# Patient Record
Sex: FEMALE | Race: WHITE | Employment: OTHER | URBAN - METROPOLITAN AREA
[De-identification: names, ages, dates, MRNs, and addresses within clinical notes are randomized per-mention and may not be internally consistent; named-entity substitution may affect disease eponyms.]

---

## 2020-08-23 ENCOUNTER — APPOINTMENT (OUTPATIENT)
Dept: GENERAL RADIOLOGY | Age: 75
DRG: 871 | End: 2020-08-23
Payer: MEDICARE

## 2020-08-23 ENCOUNTER — APPOINTMENT (OUTPATIENT)
Dept: CT IMAGING | Age: 75
DRG: 871 | End: 2020-08-23
Payer: MEDICARE

## 2020-08-23 ENCOUNTER — HOSPITAL ENCOUNTER (INPATIENT)
Age: 75
LOS: 1 days | Discharge: ANOTHER HOSPITAL | DRG: 871 | End: 2020-08-24
Attending: INTERNAL MEDICINE | Admitting: INTERNAL MEDICINE
Payer: MEDICARE

## 2020-08-23 PROBLEM — N17.9 AKI (ACUTE KIDNEY INJURY) (HCC): Status: ACTIVE | Noted: 2020-08-23

## 2020-08-23 PROBLEM — S92.901A CLOSED FRACTURE OF BONE OF RIGHT FOOT: Status: ACTIVE | Noted: 2020-08-23
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
## 2020-08-23 PROBLEM — N13.30 HYDRONEPHROSIS: Status: ACTIVE | Noted: 2020-08-23

## 2020-08-23 PROBLEM — I48.91 A-FIB (HCC): Status: ACTIVE | Noted: 2020-08-23

## 2020-08-23 PROBLEM — N39.0 UTI (URINARY TRACT INFECTION): Status: ACTIVE | Noted: 2020-08-23

## 2020-08-23 PROBLEM — R79.1 SUPRATHERAPEUTIC INR: Status: ACTIVE | Noted: 2020-08-23

## 2020-08-23 LAB
ABO/RH: NORMAL
ALBUMIN SERPL-MCNC: 4 G/DL (ref 3.5–4.6)
ALP BLD-CCNC: 62 U/L (ref 40–130)
ALT SERPL-CCNC: 7 U/L (ref 0–33)
ANION GAP SERPL CALCULATED.3IONS-SCNC: 16 MEQ/L (ref 9–15)
ANTIBODY SCREEN: NORMAL
APTT: 53.5 SEC (ref 24.4–36.8)
AST SERPL-CCNC: 16 U/L (ref 0–35)
BACTERIA: ABNORMAL /HPF
BASOPHILS ABSOLUTE: 0 K/UL (ref 0–0.2)
BASOPHILS RELATIVE PERCENT: 0.1 %
BILIRUB SERPL-MCNC: 1.6 MG/DL (ref 0.2–0.7)
BILIRUBIN URINE: NEGATIVE
BLOOD, URINE: ABNORMAL
BUN BLDV-MCNC: 53 MG/DL (ref 8–23)
CALCIUM SERPL-MCNC: 9.7 MG/DL (ref 8.5–9.9)
CHLORIDE BLD-SCNC: 89 MEQ/L (ref 95–107)
CLARITY: ABNORMAL
CO2: 25 MEQ/L (ref 20–31)
COLOR: YELLOW
CREAT SERPL-MCNC: 2.32 MG/DL (ref 0.5–0.9)
CREATININE URINE: 72.7 MG/DL
EKG ATRIAL RATE: 43 BPM
EKG Q-T INTERVAL: 400 MS
EKG QRS DURATION: 88 MS
EKG QTC CALCULATION (BAZETT): 470 MS
EKG R AXIS: 27 DEGREES
EKG T AXIS: 15 DEGREES
EKG VENTRICULAR RATE: 83 BPM
EOSINOPHILS ABSOLUTE: 0 K/UL (ref 0–0.7)
EOSINOPHILS RELATIVE PERCENT: 0 %
EPITHELIAL CELLS, UA: ABNORMAL /HPF (ref 0–5)
GFR AFRICAN AMERICAN: 24.6
GFR NON-AFRICAN AMERICAN: 20.3
GLOBULIN: 3.1 G/DL (ref 2.3–3.5)
GLUCOSE BLD-MCNC: 95 MG/DL (ref 70–99)
GLUCOSE URINE: NEGATIVE MG/DL
HCT VFR BLD CALC: 36.2 % (ref 37–47)
HEMOGLOBIN: 11.7 G/DL (ref 12–16)
HYALINE CASTS: ABNORMAL /HPF (ref 0–5)
INR BLD: 4.2
KETONES, URINE: NEGATIVE MG/DL
LEUKOCYTE ESTERASE, URINE: ABNORMAL
LYMPHOCYTES ABSOLUTE: 0.4 K/UL (ref 1–4.8)
LYMPHOCYTES RELATIVE PERCENT: 2.1 %
MCH RBC QN AUTO: 27.9 PG (ref 27–31.3)
MCHC RBC AUTO-ENTMCNC: 32.3 % (ref 33–37)
MCV RBC AUTO: 86.1 FL (ref 82–100)
MONOCYTES ABSOLUTE: 0.4 K/UL (ref 0.2–0.8)
MONOCYTES RELATIVE PERCENT: 2.6 %
NEUTROPHILS ABSOLUTE: 16.5 K/UL (ref 1.4–6.5)
NEUTROPHILS RELATIVE PERCENT: 95.2 %
NITRITE, URINE: NEGATIVE
PDW BLD-RTO: 15.7 % (ref 11.5–14.5)
PH UA: 5 (ref 5–9)
PLATELET # BLD: 266 K/UL (ref 130–400)
POTASSIUM SERPL-SCNC: 3.6 MEQ/L (ref 3.4–4.9)
PRO-BNP: 5827 PG/ML
PROTEIN UA: 100 MG/DL
PROTHROMBIN TIME: 40 SEC (ref 12.3–14.9)
RBC # BLD: 4.2 M/UL (ref 4.2–5.4)
RBC UA: ABNORMAL /HPF (ref 0–5)
SODIUM BLD-SCNC: 130 MEQ/L (ref 135–144)
SODIUM URINE: 37 MEQ/L
SPECIFIC GRAVITY UA: 1.01 (ref 1–1.03)
TOTAL CK: 129 U/L (ref 0–170)
TOTAL PROTEIN: 7.1 G/DL (ref 6.3–8)
TROPONIN: <0.01 NG/ML (ref 0–0.01)
URINE REFLEX TO CULTURE: YES
UROBILINOGEN, URINE: 1 E.U./DL
WBC # BLD: 17.4 K/UL (ref 4.8–10.8)
WBC UA: >100 /HPF (ref 0–5)

## 2020-08-23 PROCEDURE — 72170 X-RAY EXAM OF PELVIS: CPT

## 2020-08-23 PROCEDURE — 82550 ASSAY OF CK (CPK): CPT

## 2020-08-23 PROCEDURE — 1210000000 HC MED SURG R&B

## 2020-08-23 PROCEDURE — 99285 EMERGENCY DEPT VISIT HI MDM: CPT

## 2020-08-23 PROCEDURE — 6830039000 HC L3 TRAUMA ALERT

## 2020-08-23 PROCEDURE — 2580000003 HC RX 258: Performed by: PERSONAL EMERGENCY RESPONSE ATTENDANT

## 2020-08-23 PROCEDURE — 93005 ELECTROCARDIOGRAM TRACING: CPT | Performed by: PHYSICIAN ASSISTANT

## 2020-08-23 PROCEDURE — 87040 BLOOD CULTURE FOR BACTERIA: CPT

## 2020-08-23 PROCEDURE — 82570 ASSAY OF URINE CREATININE: CPT

## 2020-08-23 PROCEDURE — 6360000002 HC RX W HCPCS: Performed by: PERSONAL EMERGENCY RESPONSE ATTENDANT

## 2020-08-23 PROCEDURE — 87149 DNA/RNA DIRECT PROBE: CPT

## 2020-08-23 PROCEDURE — 72131 CT LUMBAR SPINE W/O DYE: CPT

## 2020-08-23 PROCEDURE — 73590 X-RAY EXAM OF LOWER LEG: CPT

## 2020-08-23 PROCEDURE — 73630 X-RAY EXAM OF FOOT: CPT

## 2020-08-23 PROCEDURE — 36415 COLL VENOUS BLD VENIPUNCTURE: CPT

## 2020-08-23 PROCEDURE — 86900 BLOOD TYPING SEROLOGIC ABO: CPT

## 2020-08-23 PROCEDURE — 96375 TX/PRO/DX INJ NEW DRUG ADDON: CPT

## 2020-08-23 PROCEDURE — 71045 X-RAY EXAM CHEST 1 VIEW: CPT

## 2020-08-23 PROCEDURE — 85610 PROTHROMBIN TIME: CPT

## 2020-08-23 PROCEDURE — 87077 CULTURE AEROBIC IDENTIFY: CPT

## 2020-08-23 PROCEDURE — 87086 URINE CULTURE/COLONY COUNT: CPT

## 2020-08-23 PROCEDURE — 70450 CT HEAD/BRAIN W/O DYE: CPT

## 2020-08-23 PROCEDURE — 84300 ASSAY OF URINE SODIUM: CPT

## 2020-08-23 PROCEDURE — 85025 COMPLETE CBC W/AUTO DIFF WBC: CPT

## 2020-08-23 PROCEDURE — 96365 THER/PROPH/DIAG IV INF INIT: CPT

## 2020-08-23 PROCEDURE — 73564 X-RAY EXAM KNEE 4 OR MORE: CPT

## 2020-08-23 PROCEDURE — 83880 ASSAY OF NATRIURETIC PEPTIDE: CPT

## 2020-08-23 PROCEDURE — 84484 ASSAY OF TROPONIN QUANT: CPT

## 2020-08-23 PROCEDURE — 72125 CT NECK SPINE W/O DYE: CPT

## 2020-08-23 PROCEDURE — 86901 BLOOD TYPING SEROLOGIC RH(D): CPT

## 2020-08-23 PROCEDURE — 86850 RBC ANTIBODY SCREEN: CPT

## 2020-08-23 PROCEDURE — 87186 SC STD MICRODIL/AGAR DIL: CPT

## 2020-08-23 PROCEDURE — 80053 COMPREHEN METABOLIC PANEL: CPT

## 2020-08-23 PROCEDURE — 85730 THROMBOPLASTIN TIME PARTIAL: CPT

## 2020-08-23 PROCEDURE — 81001 URINALYSIS AUTO W/SCOPE: CPT

## 2020-08-23 RX ORDER — ONDANSETRON 2 MG/ML
4 INJECTION INTRAMUSCULAR; INTRAVENOUS ONCE
Status: COMPLETED | OUTPATIENT
Start: 2020-08-23 | End: 2020-08-23

## 2020-08-23 RX ORDER — SODIUM CHLORIDE 0.9 % (FLUSH) 0.9 %
10 SYRINGE (ML) INJECTION EVERY 12 HOURS SCHEDULED
Status: DISCONTINUED | OUTPATIENT
Start: 2020-08-24 | End: 2020-08-24 | Stop reason: HOSPADM

## 2020-08-23 RX ORDER — ALLOPURINOL 100 MG/1
25 TABLET ORAL DAILY
COMMUNITY

## 2020-08-23 RX ORDER — CARVEDILOL 25 MG/1
25 TABLET ORAL 2 TIMES DAILY WITH MEALS
COMMUNITY

## 2020-08-23 RX ORDER — ONDANSETRON 2 MG/ML
4 INJECTION INTRAMUSCULAR; INTRAVENOUS EVERY 6 HOURS PRN
Status: DISCONTINUED | OUTPATIENT
Start: 2020-08-23 | End: 2020-08-24 | Stop reason: HOSPADM

## 2020-08-23 RX ORDER — ACETAMINOPHEN 325 MG/1
650 TABLET ORAL EVERY 6 HOURS PRN
Status: DISCONTINUED | OUTPATIENT
Start: 2020-08-23 | End: 2020-08-24 | Stop reason: HOSPADM

## 2020-08-23 RX ORDER — SODIUM CHLORIDE 0.9 % (FLUSH) 0.9 %
10 SYRINGE (ML) INJECTION PRN
Status: DISCONTINUED | OUTPATIENT
Start: 2020-08-23 | End: 2020-08-24 | Stop reason: HOSPADM

## 2020-08-23 RX ORDER — ASPIRIN 81 MG/1
81 TABLET, CHEWABLE ORAL DAILY
COMMUNITY

## 2020-08-23 RX ORDER — PROMETHAZINE HYDROCHLORIDE 12.5 MG/1
12.5 TABLET ORAL EVERY 6 HOURS PRN
Status: DISCONTINUED | OUTPATIENT
Start: 2020-08-23 | End: 2020-08-24 | Stop reason: HOSPADM

## 2020-08-23 RX ORDER — SODIUM CHLORIDE 9 MG/ML
INJECTION, SOLUTION INTRAVENOUS CONTINUOUS
Status: DISCONTINUED | OUTPATIENT
Start: 2020-08-24 | End: 2020-08-24 | Stop reason: HOSPADM

## 2020-08-23 RX ORDER — MYCOPHENOLATE MOFETIL 500 MG/1
TABLET ORAL 2 TIMES DAILY
COMMUNITY

## 2020-08-23 RX ORDER — MORPHINE SULFATE 2 MG/ML
2 INJECTION, SOLUTION INTRAMUSCULAR; INTRAVENOUS ONCE
Status: COMPLETED | OUTPATIENT
Start: 2020-08-23 | End: 2020-08-23

## 2020-08-23 RX ORDER — PREDNISONE 1 MG/1
5 TABLET ORAL DAILY
COMMUNITY

## 2020-08-23 RX ORDER — ACETAMINOPHEN 650 MG/1
650 SUPPOSITORY RECTAL EVERY 6 HOURS PRN
Status: DISCONTINUED | OUTPATIENT
Start: 2020-08-23 | End: 2020-08-24 | Stop reason: HOSPADM

## 2020-08-23 RX ADMIN — ONDANSETRON 4 MG: 2 INJECTION INTRAMUSCULAR; INTRAVENOUS at 19:45

## 2020-08-23 RX ADMIN — MORPHINE SULFATE 2 MG: 2 INJECTION, SOLUTION INTRAMUSCULAR; INTRAVENOUS at 19:45

## 2020-08-23 RX ADMIN — CEFTRIAXONE SODIUM 1 G: 1 INJECTION, POWDER, FOR SOLUTION INTRAMUSCULAR; INTRAVENOUS at 19:45

## 2020-08-23 ASSESSMENT — ENCOUNTER SYMPTOMS
NAUSEA: 0
VOMITING: 0
SINUS PRESSURE: 0
EYE DISCHARGE: 0
WHEEZING: 0
RHINORRHEA: 0
COLOR CHANGE: 1
COLOR CHANGE: 0
CONSTIPATION: 0
ABDOMINAL PAIN: 0
TROUBLE SWALLOWING: 0
DIARRHEA: 0
SHORTNESS OF BREATH: 0
SORE THROAT: 0
COUGH: 0
ABDOMINAL DISTENTION: 0

## 2020-08-23 ASSESSMENT — PAIN DESCRIPTION - DESCRIPTORS
DESCRIPTORS: SHARP
DESCRIPTORS: ACHING
DESCRIPTORS: SHARP

## 2020-08-23 ASSESSMENT — PAIN DESCRIPTION - ORIENTATION
ORIENTATION: RIGHT;LEFT
ORIENTATION: RIGHT

## 2020-08-23 ASSESSMENT — PAIN DESCRIPTION - PAIN TYPE
TYPE: ACUTE PAIN

## 2020-08-23 ASSESSMENT — PAIN DESCRIPTION - FREQUENCY
FREQUENCY: OTHER (COMMENT)
FREQUENCY: INTERMITTENT
FREQUENCY: CONTINUOUS

## 2020-08-23 ASSESSMENT — PAIN SCALES - GENERAL
PAINLEVEL_OUTOF10: 9
PAINLEVEL_OUTOF10: 8
PAINLEVEL_OUTOF10: 8
PAINLEVEL_OUTOF10: 5

## 2020-08-23 ASSESSMENT — PAIN DESCRIPTION - LOCATION
LOCATION: LEG
LOCATION: KNEE
LOCATION: KNEE

## 2020-08-23 NOTE — ED NOTES
Patient arrived via [address] [place] [address],[address] with complaints of a fall last night around 1 am. Patient states she fell down on her knees and then on to her buttocks. Patient has pain in her right knee and bruising and a skin tear  on her right buttocks. Patient denies hitting her head or any other complaints.      Magali Louis RN  08/23/20 7010

## 2020-08-23 NOTE — ED PROVIDER NOTES
[address] [facility] ED  eMERGENCY dEPARTMENT eNCOUnter      Pt Name: Kely Mckeon  MRN: [id number]  [place] 1945  Date of evaluation: 8/23/2020  Provider: Lary Gaona Eldred Nat       Chief Complaint   Patient presents with    Fall         HISTORY OF PRESENT ILLNESS   (Location/Symptom, Timing/Onset,Context/Setting, Quality, Duration, Modifying Factors, Severity)  Note limiting factors. Kely Mckeon is a 66 y.o. female who presents to the emergency department with a complaint of buttock pain, right knee pain, right tib-fib pain, right foot pain, secondary to a fall which patient states occurred last evening about 4:00 in the morning. Patient states she was getting up to go to the bathroom, she walked a few steps, she was very dizzy, felt as if the room was spinning, she tried to get her cane, and states she fell forward after her legs got weak landing on her knees, she states she did fall back and struck her buttock up against a dresser. She denies any loss of consciousness, no head neck or back pain at this time. He is currently rating her buttock, and right knee pain 8 out of 10. Patient states around 1 AM she got up and patient up on the edge of the bed but states she had this weird feeling in her head. She denies dizziness or lightheadedness. She states she had a bunch of thoughts running through her head that were disconnected. She then stood up and started to walk and got woozy, again denied dizziness or lightheadedness, and states she fell forward with her legs underneath her. She was able to use a stepstool as a walker to get back to her bed and has been minimally ambulatory throughout the day. She does live by herself. She does admit to chronic shortness of breath, denies any increase in leg swelling, no chest pain or cough. HPI    NursingNotes were reviewed.     REVIEW OF SYSTEMS    (2-9 systems for level 4, 10 or more for level 5)     Review of Systems Constitutional: Negative for activity change and appetite change. HENT: Negative for congestion, ear discharge, ear pain, nosebleeds, rhinorrhea and sore throat. Eyes: Negative for discharge. Respiratory: Negative for shortness of breath. Cardiovascular: Negative for chest pain, palpitations and leg swelling. Gastrointestinal: Negative for abdominal distention, abdominal pain and constipation. Genitourinary: Negative for difficulty urinating and dysuria. Musculoskeletal: Negative for arthralgias. Right knee pain, right buttock pain, right gluteal pain. Skin: Negative for color change. Neurological: Positive for dizziness and weakness. Negative for tremors, syncope, numbness and headaches. Psychiatric/Behavioral: Negative for agitation and confusion. Except as noted above the remainder of the review of systems was reviewed and negative. PAST MEDICAL HISTORY     Past Medical History:   Diagnosis Date    Atrial fibrillation ([facility] [address]     Chronic kidney disease     COPD (chronic obstructive pulmonary disease) ([facility] [address] [facility]     Hypertension     Retroperitoneal fibrosis          SURGICALHISTORY     History reviewed. No pertinent surgical history. CURRENT MEDICATIONS       Previous Medications    ALLOPURINOL (ZYLOPRIM) 100 MG TABLET    Take 25 mg by mouth daily    ASPIRIN 81 MG CHEWABLE TABLET    Take 81 mg by mouth daily    CARVEDILOL (COREG) 25 MG TABLET    Take 25 mg by mouth 2 times daily (with meals)    MYCOPHENOLATE (CELLCEPT) 500 MG TABLET    Take by mouth 2 times daily    PREDNISONE (DELTASONE) 5 MG TABLET    Take 5 mg by mouth daily       ALLERGIES     Metoprolol and Sulfa antibiotics    FAMILY HISTORY     History reviewed. No pertinent family history.        SOCIAL HISTORY       Social History     Socioeconomic History    Marital status: Single     Spouse name: None    Number of children: None    Years of education: None    Highest education level: None   Occupational History    None   Social Needs    Financial resource strain: None    Food insecurity     Worry: None     Inability: None    Transportation needs     Medical: None     Non-medical: None   Tobacco Use    Smoking status: Never Smoker    Smokeless tobacco: Never Used   Substance and Sexual Activity    Alcohol use: Yes     Alcohol/week: 2.0 standard drinks     Types: 2 Glasses of wine per week    Drug use: Never    Sexual activity: Not Currently   Lifestyle    Physical activity     Days per week: None     Minutes per session: None    Stress: None   Relationships    Social connections     Talks on phone: None     Gets together: None     Attends [employer] service: None     Active member of club or organization: None     Attends meetings of clubs or organizations: None     Relationship status: None    Intimate partner violence     Fear of current or ex partner: None     Emotionally abused: None     Physically abused: None     Forced sexual activity: None   Other Topics Concern    None   Social History Narrative    None       SCREENINGS    Vanderbilt Coma Scale  Eye Opening: Spontaneous  Best Verbal Response: Oriented  Best Motor Response: Obeys commands  Christophe Coma Scale Score: 15 @FLOW([id number])@      PHYSICAL EXAM    (up to 7 for level 4, 8 or more for level 5)     ED Triage Vitals   BP Temp Temp Source Pulse Resp SpO2 Height Weight   08/23/20 1704 08/23/20 1708 08/23/20 1708 08/23/20 1704 08/23/20 1704 08/23/20 1704 08/23/20 1708 08/23/20 1708   (!) 143/59 98.6 °F (37 °C) Oral 69 22 96 % 5' (1.524 m) 215 lb (97.5 kg)       Physical Exam  Vitals signs and nursing note reviewed. Constitutional:       General: She is not in acute distress. Appearance: She is well-developed. She is not ill-appearing, toxic-appearing or diaphoretic. HENT:      Head: Normocephalic and atraumatic.       Comments: Head and scalp are atraumatic, there is no cut scrapes abrasions noted, no depressions, no deformity, no bleeding is noted. No pain on palpation. Right Ear: Tympanic membrane normal.      Left Ear: Tympanic membrane normal.      Ears:      Comments: Tympanic membrane's are intact, there is no drainage or discharge noted from either ear. Nose: No congestion. Comments: There is intact, no deformity, no pain on palpation, no drainage or discharge. Mouth/Throat:      Mouth: Mucous membranes are moist.      Pharynx: No oropharyngeal exudate or posterior oropharyngeal erythema. Eyes:      Extraocular Movements: Extraocular movements intact. Conjunctiva/sclera: Conjunctivae normal.      Pupils: Pupils are equal, round, and reactive to light. Comments: Nipples are equal round reactive to light at approximately 3 mm, no nystagmus, no stare or gaze. Neck:      Musculoskeletal: Normal range of motion and neck supple. No neck rigidity. Vascular: No JVD. Trachea: No tracheal deviation. Comments: Neck is supple, there is no midline tenderness, no step-off, no deformity, no crepitus, full range of motion without any increased pain. Cardiovascular:      Rate and Rhythm: Normal rate. Pulses: Normal pulses. Heart sounds: Normal heart sounds. No murmur. No friction rub. No gallop. Pulmonary:      Effort: Pulmonary effort is normal. No tachypnea, accessory muscle usage, respiratory distress or retractions. Breath sounds: No stridor. No wheezing, rhonchi or rales. Comments: Lungs are clear in all fields, no wheezes rales or rhonchi heard, accessory muscle use, retractions, no pain on palpation across upper chest, mid chest, or posterior chest wall. No paradoxical movement, no flail segments. Room air saturations are 97%  Chest:      Chest wall: No tenderness. Abdominal:      General: Abdomen is flat. Bowel sounds are normal. There is no distension or abdominal bruit. Palpations:  There is no shifting dullness, fluid wave, hepatomegaly, splenomegaly, mass or pulsatile mass. Tenderness: There is no abdominal tenderness. There is no right CVA tenderness, left CVA tenderness, guarding or rebound. Negative signs include Mensah's sign, Rovsing's sign and McBurney's sign. Comments: Abdomen soft nondistended nontender no guarding mass or rebound, no bruising no prescription braces are noted. Musculoskeletal:         General: No deformity. Back:         Legs:       Comments: Patient is moving all extremities well, there is no cervical spine pain, no thoracic pain, no lumbar pain. Patient has no pain on palpation to the right posterior pelvis, there is a large bruise in the right superior gluteal region, there is a skin tear also noted in this region. Pelvis is stable there is no crepitus or deformity, there is no shortening or rotation to bilateral, no femur pain either right or left, there is mild pain on palpation to right knee, there is no crepitus or deformity, no prescription braces noted. There is no ligament laxity, mild pain on palpation to anterior tib fib the right side, pain to the ankle, and foot, there is bruising noted to the first great toe on the right side. There is no deformity or crepitus. No pain on palpation to left femur, left knee, left tib-fib, left foot or ankle. Bilateral lower extremities are neurovascular intact. Skin:     General: Skin is warm and dry. Capillary Refill: Capillary refill takes less than 2 seconds. Coloration: Skin is not jaundiced. Comments: Patient has erythema noted to right lower extremity anterior tibial region. She has highlighted this herself, she is been watching her last couple days for concerns of infection or cellulitis. There is no blistering, there is no discharge. Neurological:      General: No focal deficit present. Mental Status: She is alert and oriented to person, place, and time. Mental status is at baseline. Cranial Nerves: No cranial nerve deficit. Sensory: No sensory deficit. Motor: No weakness.       Coordination: Coordination normal.   Psychiatric:         Mood and Affect: Mood normal.         DIAGNOSTIC RESULTS     EKG: All EKG's are interpreted by the Emergency Department Physician who either signs or Co-signsthis chart in the absence of a cardiologist.    EKG shows A. fib, rate 83, heart rate irregularly irregular, no ST segment changes, PVC present    RADIOLOGY:   Non-plain filmimages such as CT, Ultrasound and MRI are read by the radiologist. Plain radiographic images are visualized and preliminarily interpreted by the emergency physician with the below findings:        Interpretation per the Radiologist below, if available at the time ofthis note:    CT Head WO Contrast    (Results Pending)   CT CERVICAL SPINE WO CONTRAST    (Results Pending)   XR PELVIS (1-2 VIEWS)    (Results Pending)   XR KNEE RIGHT (MIN 4 VIEWS)    (Results Pending)   XR TIBIA FIBULA RIGHT (2 VIEWS)    (Results Pending)   XR FOOT RIGHT (MIN 3 VIEWS)    (Results Pending)   XR CHEST PORTABLE    (Results Pending)   CT LUMBAR SPINE WO CONTRAST    (Results Pending)         ED BEDSIDE ULTRASOUND:   Performed by ED Physician - none    LABS:  Labs Reviewed   COMPREHENSIVE METABOLIC PANEL - Abnormal; Notable for the following components:       Result Value    Sodium 130 (*)     Chloride 89 (*)     Anion Gap 16 (*)     BUN 53 (*)     CREATININE 2.32 (*)     GFR Non- 20.3 (*)     GFR  24.6 (*)     Total Bilirubin 1.6 (*)     All other components within normal limits   CBC WITH AUTO DIFFERENTIAL - Abnormal; Notable for the following components:    WBC 17.4 (*)     Hemoglobin 11.7 (*)     Hematocrit 36.2 (*)     MCHC 32.3 (*)     RDW 15.7 (*)     Neutrophils Absolute 16.5 (*)     Lymphocytes Absolute 0.4 (*)     All other components within normal limits   PROTIME-INR - Abnormal; Notable for the following components:    Protime 40.0 (*)     All other components within normal limits   APTT - Abnormal; Notable for the following components:    aPTT 53.5 (*)     All other components within normal limits   URINE RT REFLEX TO CULTURE - Abnormal; Notable for the following components:    Clarity, UA CLOUDY (*)     Blood, Urine MODERATE (*)     Protein,  (*)     Leukocyte Esterase, Urine MODERATE (*)     All other components within normal limits   MICROSCOPIC URINALYSIS - Abnormal; Notable for the following components:    Bacteria, UA MANY (*)     WBC, UA >100 (*)     RBC, UA 6-10 (*)     All other components within normal limits   CULTURE, BLOOD 1   CULTURE, BLOOD 2   CULTURE, URINE   TROPONIN   CK   BRAIN NATRIURETIC PEPTIDE   SODIUM, URINE, RANDOM   CREATININE, RANDOM URINE   TYPE AND SCREEN       All other labs were within normal range or not returned as of this dictation. EMERGENCY DEPARTMENT COURSE and DIFFERENTIAL DIAGNOSIS/MDM:   Vitals:    Vitals:    08/23/20 1712 08/23/20 1741 08/23/20 1942 08/23/20 2106   BP: (!) 143/59 (!) 144/73 (!) 163/67 133/80   Pulse: 69 76 80 79   Resp: 22 20 18 18   Temp: 98.6 °F (37 °C)      TempSrc: Oral      SpO2: 97% 94% 96% 99%   Weight: 215 lb (97.5 kg)      Height: 5' (1.524 m)               MDM  Number of Diagnoses or Management Options  Diagnosis management comments: Patient turned over to PA Jemma montaño at 1800 hrs. secondary to a fall which occurred last evening. At the time patient was turned over, all labs, CT scans, and x-rays are pending completion. 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 No acute process. CT of cervical spine shows osteopenia without acute fracture or subluxation. Chest x-ray is concerning for increased pulmonary vascular congestion. Foot x-ray shows proximal phalanx great toe fracture, and 2-4th metatarsal fractures. Patient denies any pulmonary history. lab work reveals sodium 130, chloride 89, BUN 53, creatinine 2.32, WBC 17.4, INR 4.2, BNP 5827.  Patient does have a UTI was placed on Rocephin. She does look dry on clinical exam and is keeping herself hydrated at the bedside with water. She does admit to decreased appetite today. She was placed in short leg walking boot for fractures. CRITICAL CARE TIME   Total Critical Care time was 0 minutes, excluding separately reportableprocedures. There was a high probability of clinicallysignificant/life threatening deterioration in the patient's condition which required my urgent intervention. CONSULTS:  None    PROCEDURES:  Unless otherwise noted below, none     Procedures    FINAL IMPRESSION      1. Fall, initial encounter    2. MIRIAM (acute kidney injury) ([facility] [address]    3. Hyponatremia    4. Acute cystitis without hematuria    5. Acute on chronic systolic congestive heart failure (HCC)    6. Closed fracture of right foot, initial encounter          DISPOSITION/PLAN   DISPOSITION Decision To Admit 08/23/2020 09:01:42 PM      PATIENT REFERRED TO:  No follow-up provider specified. DISCHARGE MEDICATIONS:  New Prescriptions    No medications on file          (Please note that portions of this note were completed with a voice recognition program.  Efforts were made to edit the dictations but occasionally words are mis-transcribed. )    ANDREA Freeman (electronically signed)  Attending Emergency Physician         ANDREA Gates  08/23/20 2111       Beverley Gates  08/23/20 2113

## 2020-08-24 ENCOUNTER — APPOINTMENT (OUTPATIENT)
Dept: ULTRASOUND IMAGING | Age: 75
DRG: 871 | End: 2020-08-24
Payer: MEDICARE

## 2020-08-24 VITALS
TEMPERATURE: 98.3 F | OXYGEN SATURATION: 95 % | HEART RATE: 76 BPM | BODY MASS INDEX: 42.21 KG/M2 | HEIGHT: 60 IN | DIASTOLIC BLOOD PRESSURE: 60 MMHG | SYSTOLIC BLOOD PRESSURE: 127 MMHG | RESPIRATION RATE: 18 BRPM | WEIGHT: 215 LBS

## 2020-08-24 LAB
ALBUMIN SERPL-MCNC: 3.1 G/DL (ref 3.5–4.6)
ALP BLD-CCNC: 55 U/L (ref 40–130)
ALT SERPL-CCNC: 6 U/L (ref 0–33)
ANION GAP SERPL CALCULATED.3IONS-SCNC: 18 MEQ/L (ref 9–15)
AST SERPL-CCNC: 10 U/L (ref 0–35)
BASOPHILS ABSOLUTE: 0 K/UL (ref 0–0.2)
BASOPHILS RELATIVE PERCENT: 0.2 %
BILIRUB SERPL-MCNC: 1 MG/DL (ref 0.2–0.7)
BUN BLDV-MCNC: 55 MG/DL (ref 8–23)
CALCIUM SERPL-MCNC: 9.1 MG/DL (ref 8.5–9.9)
CHLORIDE BLD-SCNC: 91 MEQ/L (ref 95–107)
CO2: 21 MEQ/L (ref 20–31)
CREAT SERPL-MCNC: 2.31 MG/DL (ref 0.5–0.9)
EOSINOPHILS ABSOLUTE: 0 K/UL (ref 0–0.7)
EOSINOPHILS RELATIVE PERCENT: 0.1 %
GFR AFRICAN AMERICAN: 24.7
GFR NON-AFRICAN AMERICAN: 20.4
GLOBULIN: 3.4 G/DL (ref 2.3–3.5)
GLUCOSE BLD-MCNC: 94 MG/DL (ref 70–99)
HCT VFR BLD CALC: 34.1 % (ref 37–47)
HEMOGLOBIN: 11.3 G/DL (ref 12–16)
LYMPHOCYTES ABSOLUTE: 0.6 K/UL (ref 1–4.8)
LYMPHOCYTES RELATIVE PERCENT: 4.3 %
MAGNESIUM: 2.3 MG/DL (ref 1.7–2.4)
MCH RBC QN AUTO: 28.9 PG (ref 27–31.3)
MCHC RBC AUTO-ENTMCNC: 33 % (ref 33–37)
MCV RBC AUTO: 87.7 FL (ref 82–100)
MONOCYTES ABSOLUTE: 0.8 K/UL (ref 0.2–0.8)
MONOCYTES RELATIVE PERCENT: 5.6 %
NEUTROPHILS ABSOLUTE: 12.2 K/UL (ref 1.4–6.5)
NEUTROPHILS RELATIVE PERCENT: 89.8 %
PDW BLD-RTO: 15.6 % (ref 11.5–14.5)
PLATELET # BLD: 235 K/UL (ref 130–400)
POTASSIUM REFLEX MAGNESIUM: 3.5 MEQ/L (ref 3.4–4.9)
RBC # BLD: 3.89 M/UL (ref 4.2–5.4)
SODIUM BLD-SCNC: 130 MEQ/L (ref 135–144)
TOTAL PROTEIN: 6.5 G/DL (ref 6.3–8)
WBC # BLD: 13.6 K/UL (ref 4.8–10.8)

## 2020-08-24 PROCEDURE — 80053 COMPREHEN METABOLIC PANEL: CPT

## 2020-08-24 PROCEDURE — 93010 ELECTROCARDIOGRAM REPORT: CPT | Performed by: INTERNAL MEDICINE

## 2020-08-24 PROCEDURE — 2580000003 HC RX 258: Performed by: NURSE PRACTITIONER

## 2020-08-24 PROCEDURE — 85025 COMPLETE CBC W/AUTO DIFF WBC: CPT

## 2020-08-24 PROCEDURE — 6360000002 HC RX W HCPCS: Performed by: INTERNAL MEDICINE

## 2020-08-24 PROCEDURE — 87040 BLOOD CULTURE FOR BACTERIA: CPT

## 2020-08-24 PROCEDURE — 2580000003 HC RX 258: Performed by: INTERNAL MEDICINE

## 2020-08-24 PROCEDURE — 6370000000 HC RX 637 (ALT 250 FOR IP): Performed by: INTERNAL MEDICINE

## 2020-08-24 PROCEDURE — 76775 US EXAM ABDO BACK WALL LIM: CPT

## 2020-08-24 PROCEDURE — 36415 COLL VENOUS BLD VENIPUNCTURE: CPT

## 2020-08-24 PROCEDURE — 6370000000 HC RX 637 (ALT 250 FOR IP): Performed by: NURSE PRACTITIONER

## 2020-08-24 PROCEDURE — 83735 ASSAY OF MAGNESIUM: CPT

## 2020-08-24 RX ORDER — ALLOPURINOL 100 MG/1
100 TABLET ORAL DAILY
Status: DISCONTINUED | OUTPATIENT
Start: 2020-08-25 | End: 2020-08-24 | Stop reason: HOSPADM

## 2020-08-24 RX ORDER — ALLOPURINOL 100 MG/1
25 TABLET ORAL DAILY
Status: DISCONTINUED | OUTPATIENT
Start: 2020-08-24 | End: 2020-08-24

## 2020-08-24 RX ORDER — CARVEDILOL 6.25 MG/1
6.25 TABLET ORAL 2 TIMES DAILY WITH MEALS
Status: DISCONTINUED | OUTPATIENT
Start: 2020-08-24 | End: 2020-08-24 | Stop reason: HOSPADM

## 2020-08-24 RX ORDER — MYCOPHENOLATE MOFETIL 250 MG/1
500 CAPSULE ORAL 2 TIMES DAILY
Status: DISCONTINUED | OUTPATIENT
Start: 2020-08-24 | End: 2020-08-24 | Stop reason: HOSPADM

## 2020-08-24 RX ORDER — PRAVASTATIN SODIUM 20 MG
20 TABLET ORAL NIGHTLY
Status: DISCONTINUED | OUTPATIENT
Start: 2020-08-24 | End: 2020-08-24 | Stop reason: HOSPADM

## 2020-08-24 RX ORDER — ASPIRIN 81 MG/1
81 TABLET, CHEWABLE ORAL DAILY
Status: DISCONTINUED | OUTPATIENT
Start: 2020-08-24 | End: 2020-08-24 | Stop reason: HOSPADM

## 2020-08-24 RX ORDER — CARVEDILOL 25 MG/1
25 TABLET ORAL 2 TIMES DAILY WITH MEALS
Status: DISCONTINUED | OUTPATIENT
Start: 2020-08-24 | End: 2020-08-24

## 2020-08-24 RX ADMIN — ACETAMINOPHEN 650 MG: 325 TABLET, FILM COATED ORAL at 10:26

## 2020-08-24 RX ADMIN — Medication 10 ML: at 10:26

## 2020-08-24 RX ADMIN — ASPIRIN 81 MG: 81 TABLET, CHEWABLE ORAL at 17:14

## 2020-08-24 RX ADMIN — CEFTRIAXONE SODIUM 1 G: 1 INJECTION, POWDER, FOR SOLUTION INTRAMUSCULAR; INTRAVENOUS at 11:22

## 2020-08-24 RX ADMIN — CARVEDILOL 6.25 MG: 6.25 TABLET, FILM COATED ORAL at 17:14

## 2020-08-24 RX ADMIN — SODIUM CHLORIDE: 9 INJECTION, SOLUTION INTRAVENOUS at 00:31

## 2020-08-24 ASSESSMENT — PAIN DESCRIPTION - PAIN TYPE: TYPE: ACUTE PAIN

## 2020-08-24 ASSESSMENT — PAIN SCALES - GENERAL
PAINLEVEL_OUTOF10: 0
PAINLEVEL_OUTOF10: 3
PAINLEVEL_OUTOF10: 4
PAINLEVEL_OUTOF10: 0

## 2020-08-24 ASSESSMENT — PAIN DESCRIPTION - LOCATION: LOCATION: FOOT

## 2020-08-24 ASSESSMENT — PAIN DESCRIPTION - ORIENTATION: ORIENTATION: RIGHT;LOWER

## 2020-08-24 NOTE — H&P
[facility]  MEDICINE    HISTORY AND PHYSICAL EXAM    PATIENT NAME:  Klaudia Justice    MRN:  [id number]  SERVICE DATE:  8/23/2020   SERVICE TIME:  10:10 PM    Primary Care Physician: Ania Burnett MD         SUBJECTIVE  CHIEF COMPLAINT:  Fall    HPI:  This is a 66 y.o. female w PMH Afib, CKD, hydronephrosis, who presents with injuries sustained in fall. She states she woke up in middle of night to go to bathroom,. She was disoriented, weak but attempted to walk anyway and fell. She has pain on right buttocks and right foot. She denies HA or vision changes. She admits to poor fluid intake. She is anticoagulated for chronic afib. Labs consistent w MIRIAM, UTI   She is admitted for further eval and treatment. PAST MEDICAL HISTORY:    Past Medical History:   Diagnosis Date    Atrial fibrillation ([address]     Chronic kidney disease     COPD (chronic obstructive pulmonary disease) ([address]     Hypertension     Retroperitoneal fibrosis      PAST SURGICAL HISTORY:  History reviewed. No pertinent surgical history. FAMILY HISTORY:  History reviewed. No pertinent family history. SOCIAL HISTORY:    Social History     Socioeconomic History    Marital status: Single     Spouse name: Not on file    Number of children: Not on file    Years of education: Not on file    Highest education level: Not on file   Occupational History    Not on file   Social Needs    Financial resource strain: Not on file    Food insecurity     Worry: Not on file     Inability: Not on file    Transportation needs     Medical: Not on file     Non-medical: Not on file   Tobacco Use    Smoking status: Never Smoker    Smokeless tobacco: Never Used   Substance and Sexual Activity    Alcohol use:  Yes     Alcohol/week: 2.0 standard drinks     Types: 2 Glasses of wine per week    Drug use: Never    Sexual activity: Not Currently   Lifestyle    Physical activity     Days per week: Not on file     Minutes per session: Not on file    Stress: Not on file   Relationships    Social connections     Talks on phone: Not on file     Gets together: Not on file     Attends Bahai service: Not on file     Active member of club or organization: Not on file     Attends meetings of clubs or organizations: Not on file     Relationship status: Not on file    Intimate partner violence     Fear of current or ex partner: Not on file     Emotionally abused: Not on file     Physically abused: Not on file     Forced sexual activity: Not on file   Other Topics Concern    Not on file   Social History Narrative    Not on file     MEDICATIONS:   Prior to Admission medications    Medication Sig Start Date End Date Taking? Authorizing Provider   predniSONE (DELTASONE) 5 MG tablet Take 5 mg by mouth daily   Yes Historical Provider, MD   allopurinol (ZYLOPRIM) 100 MG tablet Take 25 mg by mouth daily   Yes Historical Provider, MD   mycophenolate (CELLCEPT) 500 MG tablet Take by mouth 2 times daily   Yes Historical Provider, MD   carvedilol (COREG) 25 MG tablet Take 25 mg by mouth 2 times daily (with meals)   Yes Historical Provider, MD   aspirin 81 MG chewable tablet Take 81 mg by mouth daily   Yes Historical Provider, MD       ALLERGIES: Metoprolol and Sulfa antibiotics    REVIEW OF SYSTEM:   Review of Systems   Constitutional: Positive for fatigue. Negative for appetite change, chills and fever. HENT: Negative for sinus pressure, sore throat, tinnitus and trouble swallowing. Eyes: Negative for discharge and visual disturbance. Respiratory: Negative for cough, shortness of breath and wheezing. Cardiovascular: Negative for chest pain, palpitations and leg swelling. Gastrointestinal: Negative for abdominal distention, abdominal pain, constipation, diarrhea, nausea and vomiting. Genitourinary: Negative for dysuria, hematuria and pelvic pain. Musculoskeletal: Positive for gait problem.         Uses cane for stability   Skin: Positive for color change. Large bruise right buttocks  Bruised right great toe. Neurological: Positive for dizziness, weakness and light-headedness. Negative for tremors, seizures, syncope, speech difficulty, numbness and headaches. Psychiatric/Behavioral: Negative for agitation. The patient is not nervous/anxious. OBJECTIVE  PHYSICAL EXAM: /80   Pulse 79   Temp 98.6 °F (37 °C) (Oral)   Resp 18   Ht 5' (1.524 m)   Wt 215 lb (97.5 kg)   SpO2 99%   BMI 41.99 kg/m²   Physical Exam  Constitutional:       General: She is not in acute distress. Appearance: Normal appearance. HENT:      Head: Normocephalic. Mouth/Throat:      Mouth: Mucous membranes are dry. Eyes:      Extraocular Movements: Extraocular movements intact. Conjunctiva/sclera: Conjunctivae normal.      Pupils: Pupils are equal, round, and reactive to light. Neck:      Musculoskeletal: No muscular tenderness. Vascular: No carotid bruit. Cardiovascular:      Rate and Rhythm: Normal rate. Rhythm irregular. Pulses: Normal pulses. Heart sounds: Normal heart sounds. Pulmonary:      Effort: Pulmonary effort is normal.      Breath sounds: Normal breath sounds. No wheezing or rhonchi. Abdominal:      General: Bowel sounds are normal.      Palpations: Abdomen is soft. Tenderness: There is no abdominal tenderness. Hernia: No hernia is present. Musculoskeletal:         General: Signs of injury present. Right lower leg: No edema. Left lower leg: No edema. Lymphadenopathy:      Cervical: No cervical adenopathy. Skin:     General: Skin is warm and dry. Neurological:      General: No focal deficit present. Mental Status: She is alert and oriented to person, place, and time. Psychiatric:         Mood and Affect: Mood normal.         Behavior: Behavior normal.       DATA:     Diagnostic tests reviewed for today's visit:    Most recent labs and imaging results reviewed.      LABS:    Recent Clear    Glucose, Ur Negative Negative mg/dL    Bilirubin Urine Negative Negative    Ketones, Urine Negative Negative mg/dL    Specific Gravity, UA 1.013 1.005 - 1.030    Blood, Urine MODERATE (A) Negative    pH, UA 5.0 5.0 - 9.0    Protein,  (A) Negative mg/dL    Urobilinogen, Urine 1.0 <2.0 E.U./dL    Nitrite, Urine Negative Negative    Leukocyte Esterase, Urine MODERATE (A) Negative    Urine Reflex to Culture Yes    Troponin    Collection Time: 08/23/20  5:45 PM   Result Value Ref Range    Troponin <0.010 0.000 - 0.010 ng/mL   CK    Collection Time: 08/23/20  5:45 PM   Result Value Ref Range    Total  0 - 170 U/L   Brain Natriuretic Peptide    Collection Time: 08/23/20  5:45 PM   Result Value Ref Range    Pro-BNP 5,827 pg/mL   Microscopic Urinalysis    Collection Time: 08/23/20  5:45 PM   Result Value Ref Range    Bacteria, UA MANY (A) Negative /HPF    Hyaline Casts, UA 0-1 0 - 5 /HPF    WBC, UA >100 (H) 0 - 5 /HPF    RBC, UA 6-10 (A) 0 - 5 /HPF    Epithelial Cells, UA 0-2 0 - 5 /HPF   EKG 12 Lead - Chest Pain    Collection Time: 08/23/20  6:52 PM   Result Value Ref Range    Ventricular Rate 83 BPM    Atrial Rate 43 BPM    QRS Duration 88 ms    Q-T Interval 400 ms    QTc Calculation (Bazett) 470 ms    R Axis 27 degrees    T Axis 15 degrees   Sodium, urine, random    Collection Time: 08/23/20  9:15 PM   Result Value Ref Range    Sodium, Ur 37 Not Established mEq/L   Creatinine, Random Urine    Collection Time: 08/23/20  9:15 PM   Result Value Ref Range    Creatinine, Ur 72.7 Not Established mg/dL       IMAGING:  No results found. VTE Prophylaxis: coumadin   supratherapeutic INR    ASSESSMENT AND PLAN  Active Problems:    MIRIAM (acute kidney injury) ([facility]  BUN 89  Scr 2.32 w GFR 20.3   Baseline appears to be Scr  1.8  She states she is chronically dehydrated \"they always tell me I'm not drinking enough\"  Appears dry. Dizzy when getting out of bed. Plan: Admit   Gentle hydration,  Monitor labs. Fall  Up in middle of night,  Disoriented  Dizzy. Bruising,  Foot fracture. No head injury  She is anticoagulated  Uses cane at home for stability. .   Plan: monitor   Continue cane use. May need PT after fracture heals. Closed fracture of bone of right foot   From fall. Bruising noted in right great toe. Painful to touch. Plan: Boot,  Tylenol for pain. UTI (urinary tract infection)   Afebrile. WBC 17.4,  Urine w blood, leukocytes, bacteria. Disorientation that led to fall  Plan: Rocephin - await culture results. Daily CBC w diff      A-fib (HCC)  Chronic  Rate controlled   Coumadin   Plan: resume carvedilol,  Hold coumadin for now  INR 4.2       Hydronephrosis  Long history w ureter stent placement  changed out in July  No hematuria or plevic, flank or bladder pain    Possible UTI. Dehydration w MIRIAM  Plan: management per urology       Supratherapeutic INR  INR 4.2   Goal 2-3 for chronic afib. Bruising from recent fall trauma. Neuro intact,   Plan: hold coumadin for now.   Daily INR>         Plan of care discussed with: patient    SIGNATURE: CORNELIUS Patel - CNP  DATE: August 23, 2020  TIME: 10:10 PM     Isidro Rea MD - supervising physician

## 2020-08-24 NOTE — PROGRESS NOTES
Physician Progress Note      PATIENT:               Talita Hartman  CSN #:                  [id number]  :                       1945  ADMIT DATE:       2020 5:01 PM  DISCH DATE:  RESPONDING  PROVIDER #:        Leander Cogan MAJUMDAR DO          QUERY TEXT:    Pt admitted with cellulitis. Pt noted to have low serum sodium. If possible,   please document in the progress notes and discharge summary if you are   evaluating and / or treating any of the following: The medical record reflects the following:  Risk Factors: CKD3  Clinical Indicators: /130  Treatment: lab monitoring, [place]  Options provided:  -- Hyponatremia  -- Clinically insignificant low serum sodium  -- Other - I will add my own diagnosis  -- Disagree - Not applicable / Not valid  -- Disagree - Clinically unable to determine / Unknown  -- Refer to Clinical Documentation Reviewer    PROVIDER RESPONSE TEXT:    This patient has hyponatremia.     Query created by: Sophy Garcia on 2020 10:21 AM      Electronically signed by:  Francisca Hoffmann DO 2020 2:42 PM

## 2020-08-24 NOTE — CONSULTS
PODIATRIC MEDICINE AND SURGERY  CONSULT HISTORY AND PHYSICAL    Consulting Service:  Medicine  Requesting Provider: Dr. Lord Ward regarding: right foot fractures   Staff Doctor:  Dr. Gearold Closs:  66 y.o. female with PMH below for who podiatry was consulted for right foot fractures. Patient states that she went to go to the bathroom last night and she fell. X-rays show multiple fractures of the metatarsals plus the proximal phalanx of the hallux. Patient states that she would like to be transferred to [place] and continue her care there. This is where her urologist is. At this time her foot is stable and she will be placed in a cam boot and if desired may follow-up with Dr. Desi Karimi in the office    PLAN AND RECOMMENDATIONS[de-identified]  Patient's case to be discussed with staff, Dr. Desi Karimi, who will provide final recommendations going forward  Nonweightbearing to the right lower extremity  Elevate right lower extremity at or above heart level while resting  X-rays show multiple right foot fractures  Ace bandage applied to the right foot  Patient to be placed in cam walker boot and to be partial to nonweightbearing depending on tolerance. Pain management per primary team   Podiatry to follow while in house  If desired, patient to follow-up with Dr. Desi Karimi within 10 days of discharge    HPI: This 66y.o. year old female was seen today for multiple fractures of the right foot. Patient states that last night she went to the bathroom and she fell. States that she did not get dizzy she was just confused. Admits that she has not been able to walk since that incident. Is in a lot of pain to the right lower extremity including her knee and her foot. She does not believe that she is even able to walk right now. Patient denies nausea, vomiting, diarrhea, fevers, chills, chest pain, shortness of breath, head ache, or calf pain. No other pedal complaints.      Past Medical History:   Diagnosis Date    Atrial fibrillation ([facility]     Chronic kidney disease     COPD (chronic obstructive pulmonary disease) ([facility]     Hypertension     Retroperitoneal fibrosis        History reviewed. No pertinent surgical history. No current facility-administered medications on file prior to encounter. Current Outpatient Medications on File Prior to Encounter   Medication Sig Dispense Refill    predniSONE (DELTASONE) 5 MG tablet Take 5 mg by mouth daily      allopurinol (ZYLOPRIM) 100 MG tablet Take 25 mg by mouth daily      mycophenolate (CELLCEPT) 500 MG tablet Take by mouth 2 times daily      carvedilol (COREG) 25 MG tablet Take 25 mg by mouth 2 times daily (with meals)      aspirin 81 MG chewable tablet Take 81 mg by mouth daily         Allergies   Allergen Reactions    Metoprolol     Sulfa Antibiotics        History reviewed. No pertinent family history. Social History     Socioeconomic History    Marital status: Single     Spouse name: Not on file    Number of children: Not on file    Years of education: Not on file    Highest education level: Not on file   Occupational History    Not on file   Social Needs    Financial resource strain: Not on file    Food insecurity     Worry: Not on file     Inability: Not on file    Transportation needs     Medical: Not on file     Non-medical: Not on file   Tobacco Use    Smoking status: Never Smoker    Smokeless tobacco: Never Used   Substance and Sexual Activity    Alcohol use:  Yes     Alcohol/week: 2.0 standard drinks     Types: 2 Glasses of wine per week    Drug use: Never    Sexual activity: Not Currently   Lifestyle    Physical activity     Days per week: Not on file     Minutes per session: Not on file    Stress: Not on file   Relationships    Social connections     Talks on phone: Not on file     Gets together: Not on file     Attends Yarsani service: Not on file     Active member of club or organization: Not on file     Attends meetings of clubs or organizations: Not on file     Relationship status: Not on file    Intimate partner violence     Fear of current or ex partner: Not on file     Emotionally abused: Not on file     Physically abused: Not on file     Forced sexual activity: Not on file   Other Topics Concern    Not on file   Social History Narrative    Not on file       Review of Systems  CONSTITUTIONAL: No fevers, chills, diaphoresis  HEENT: No epistaxis, tinnitus  EYES: No diplopia, blurry vision. CARDIOVASCULAR:  No chest pain, palpitations, lower extremity edema  PULM: No dyspnea, tachypnea, wheezing  GI: No nausea, vomiting, constipation, diarrhea  : No dysuria, gross hematuria, or pyuria  NEURO: Denies signs of pedal neuropathy  MSK: Right lower extremity pain  PSY: No concerns regarding depression, anxiety  INTEGUMENTARY: Increased erythema with some ecchymosis to the right lower extremity to the level below the knee    OBJECTIVE:  /60   Pulse 76   Temp 98.3 °F (36.8 °C) (Oral)   Resp 18   Ht 5' (1.524 m)   Wt 215 lb (97.5 kg)   SpO2 95%   BMI 41.99 kg/m²   Patient is alert and oriented to person, place, and time    Right lower extremity focused exam    Vascular:   nonPalpable Dorsalis Pedis due to edema and Palpable Posterior Tibial Pulses B/L   Capillary Fill time < 3 seconds to digits  Skin temperature warm to warm tibial tuberosity to the digits   Hair growth is absent  Positive edema  Positive varicosities     Neurological:   Sensation to light touch intact   Protective sensation via monofilament testing intact     Musculoskeletal/Orthopaedic:   Deferred due to right lower extremity pain.   No gross deformity noted  There is diffuse pain noted to the right foot and knee    Dermatological:   Skin appears to be thin and shiny  There are no open lesions noted  There is no fracture blisters to the right foot  There is mild ecchymosis noted to the right lower extremity  There is cellulitic appearance to the right foot and leg which is marked below the level of the knee. Seems to be improved      LABS:   Lab Results   Component Value Date    WBC 13.6 (H) 08/24/2020    HGB 11.3 (L) 08/24/2020    HCT 34.1 (L) 08/24/2020    MCV 87.7 08/24/2020     08/24/2020     Lab Results   Component Value Date     08/24/2020    K 3.5 08/24/2020    CL 91 08/24/2020    CO2 21 08/24/2020    BUN 55 08/24/2020    CREATININE 2.31 08/24/2020    GLUCOSE 94 08/24/2020    CALCIUM 9.1 08/24/2020      Lab Results   Component Value Date    LABALBU 3.1 (L) 08/24/2020     No results found for: SEDRATE  No results found for: CRP  No results found for: LABA1C      IMAGING:   Impression    Right foot:    1. Suspected comminuted fractures of the second, third and fourth digit right foot metatarsals at the head/neck junctional regions. 2. Suspected intra-articular fracture of the distal aspects of the right. Digit proximal phalanx with intra-articular extension into the PIP joint. 3. Comminuted fracture of the right first digit proximal phalanx. 4. Given the presence of multiple fractures, further evaluation with CT scan of the right foot is recommended. 5. Vascular calcifications. 6. Extensive dystrophic calcification Achilles tendon. 7. Osteopenia. 8. Moderate degenerative changes first digit MTP joint.         Right tibia/fibula:    1. No acute fracture identified. 2. Moderately severe medial with moderate lateral osteoarthritic changes of the right knee. Anne Oreilly DPM PGY-3  Podiatric Surgery Resident  Podiatry On Call Pager: [phone]  August 24, 2020  4:12 PM     The resident/student was under my direct supervision during today's patient encounter. reflection of my personal examination, assessment and treatment plan. The patient was seen and examined with the resident/student. The above findings and recommendations were reviewed and I agree with above treatment plan.   Any questions or concerns, please Dr. Rene Thorpe or podiatry resident on call.     Renato Godinez DPM  Podiatry Attending  08/26/20  12:46 PM

## 2020-08-24 NOTE — DISCHARGE SUMMARY
[facility] Medicine Discharge Summary    Arvella Eisenmenger  :  1945  MRN:  [id number]    Admit date:  2020  Discharge date:  2020    Admitting Physician: Kathy Rodney MD  Primary Care Physician:  Tyron Brito MD    Discharge Diagnoses: Active Problems:    MIRIAM (acute kidney injury) ([address]    Fall    Closed fracture of bone of right foot    UTI (urinary tract infection)    A-fib (HCC)    Supratherapeutic INR    Hydronephrosis  Resolved Problems:    * No resolved hospital problems. *    Chief Complaint   Patient presents with    Fall       Condition: improved   Activity: no strenuous activity. Nonweightbearing to the right lower extremity  Diet: regular  Disposition: [facility]  Functional Status: ambulatory with assistance    Significant Findings:     Renal US:  There is bilateral hydronephrosis. It is moderate bilaterally and the left ureter in the proximal portion is dilated. The distal portion is not visualized. 2/2 BCx growing GNR. UA>100wbc. UCx in process. XR:  Right foot:    1. Suspected comminuted fractures of the second, third and fourth digit right foot metatarsals at the head/neck junctional regions. 2. Suspected intra-articular fracture of the distal aspects of the right. Digit proximal phalanx with intra-articular extension into the PIP joint. 3. Comminuted fracture of the right first digit proximal phalanx. 4. Given the presence of multiple fractures, further evaluation with CT scan of the right foot is recommended. 5. Vascular calcifications. 6. Extensive dystrophic calcification Achilles tendon. 7. Osteopenia. 8. Moderate degenerative changes first digit MTP joint.         Right tibia/fibula:    1. No acute fracture identified. 2. Moderately severe medial with moderate lateral osteoarthritic changes of the right knee.         AP pelvis:    1. No acute fracture identified. 2. Mild degenerative changes bilateral hips.

## 2020-08-24 NOTE — PROGRESS NOTES
Physician Progress Note    2020   4:16 PM    Name:  Imer Whiting  MRN:    [id number]      Day: 1     Admit Date: 2020  5:01 PM  PCP: Cecy Blas MD    Code Status:  Full Code    Subjective:     She is having pain in her right foot but otherwise denies complaints. No nausea, chest pain, dyspnea, abdominal pain, flank pain. No problems with urination.     Current Facility-Administered Medications   Medication Dose Route Frequency Provider Last Rate Last Dose    cefTRIAXone (ROCEPHIN) 2 g IVPB in D5W 50ml minibag  2 g Intravenous Q24H Bonnie Gun, DO        allopurinol (ZYLOPRIM) tablet 25 mg  25 mg Oral Daily Bonnie Gun, DO        aspirin chewable tablet 81 mg  81 mg Oral Daily Bonnie Gun, DO        mycophenolate (CELLCEPT) capsule 500 mg  500 mg Oral BID Bonnie Gun, DO        carvedilol (COREG) tablet 25 mg  25 mg Oral BID  Bonnie Gun, DO        sodium chloride flush 0.9 % injection 10 mL  10 mL Intravenous 2 times per day Dariana Euler, APRN - CNP   10 mL at 20 1026    sodium chloride flush 0.9 % injection 10 mL  10 mL Intravenous PRN Dariaan Euler, APRN - CNP        acetaminophen (TYLENOL) tablet 650 mg  650 mg Oral Q6H PRN Dariana Euler, APRN - CNP   650 mg at 20 1026    Or    acetaminophen (TYLENOL) suppository 650 mg  650 mg Rectal Q6H PRN Dariana Euler, APRN - CNP        promethazine (PHENERGAN) tablet 12.5 mg  12.5 mg Oral Q6H PRN Dariana Euler, APRN - CNP        Or    ondansetron [facility]) injection 4 mg  4 mg Intravenous Q6H PRN Dariana Euler, APRN - CNP        0.9 % sodium chloride infusion   Intravenous Continuous Dariana Euler, APRN - CNP 75 mL/hr at 20 0031         Physical Examination:      Vitals:  /60   Pulse 76   Temp 98.3 °F (36.8 °C) (Oral)   Resp 18   Ht 5' (1.524 m)   Wt 215 lb (97.5 kg)   SpO2 95%   BMI 41.99 kg/m²   Temp (24hrs), Av.5 °F

## 2020-08-24 NOTE — PLAN OF CARE
Patient accepted for transfer to [address] [place] [address]. Transfer team to call the floor when bed is available.

## 2020-08-24 NOTE — PROGRESS NOTES
Physical Therapy Missed Treatment   Facility/Department: [facility] MED SURG J645/H232-07    NAME: Naa Mcclure    : 1945 (66 y.o.)  MRN: [id number]    Account: [de-identified]  Gender: female    Chart reviewed. Hold PT evaluation at this time until consult with podiatry for right foot fracture. Will follow and attempt PT evaluation again at earliest availability.        Gina Tyler, PT, 20 at 3:07 PM

## 2020-08-25 LAB
CULTURE, BLOOD 2: ABNORMAL
ORGANISM: ABNORMAL
ORGANISM: ABNORMAL

## 2020-08-25 NOTE — PROGRESS NOTES
Pt tranfered with IV in, orthopedic boot with the pt, pt was wearing glasses when leaving the floor. Personal belongings are with the pt.

## 2020-08-26 LAB
BLOOD CULTURE, ROUTINE: ABNORMAL
BLOOD CULTURE, ROUTINE: ABNORMAL
ORGANISM: ABNORMAL
ORGANISM: ABNORMAL
URINE CULTURE, ROUTINE: ABNORMAL

## 2020-08-29 LAB — BLOOD CULTURE, ROUTINE: NORMAL

## 2020-09-05 LAB
INR BLD: 3.7
PROTHROMBIN TIME: 36.3 SEC (ref 12.3–14.9)

## 2020-09-22 PROBLEM — N39.0 UTI (URINARY TRACT INFECTION): Status: RESOLVED | Noted: 2020-08-23 | Resolved: 2020-09-22
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
## 2024-02-16 ENCOUNTER — HOSPITAL ENCOUNTER (EMERGENCY)
Age: 79
Discharge: HOME | End: 2024-02-17
Attending: EMERGENCY MEDICINE
Payer: MEDICARE

## 2024-02-16 ENCOUNTER — APPOINTMENT (OUTPATIENT)
Dept: CT IMAGING | Age: 79
End: 2024-02-16
Attending: EMERGENCY MEDICINE
Payer: MEDICARE

## 2024-02-16 VITALS
OXYGEN SATURATION: 97 % | TEMPERATURE: 98.2 F | BODY MASS INDEX: 31.8 KG/M2 | DIASTOLIC BLOOD PRESSURE: 53 MMHG | SYSTOLIC BLOOD PRESSURE: 146 MMHG | WEIGHT: 162 LBS | HEIGHT: 60 IN | RESPIRATION RATE: 18 BRPM | HEART RATE: 76 BPM

## 2024-02-16 DIAGNOSIS — T83.83XA NEPHROSTOMY TUBE BLEED (HCC): Primary | ICD-10-CM

## 2024-02-16 LAB
ACANTHOCYTES BLD QL SMEAR: ABNORMAL
ALBUMIN SERPL-MCNC: 2.8 G/DL (ref 3.5–4.6)
ALP SERPL-CCNC: 72 U/L (ref 40–130)
ALT SERPL-CCNC: 7 U/L (ref 0–33)
ANION GAP SERPL CALCULATED.3IONS-SCNC: 9 MEQ/L (ref 9–15)
ANISOCYTOSIS BLD QL SMEAR: ABNORMAL
AST SERPL-CCNC: 10 U/L (ref 0–35)
BASOPHILS # BLD: 0 K/UL (ref 0–0.2)
BASOPHILS NFR BLD: 0.3 %
BILIRUB SERPL-MCNC: 0.4 MG/DL (ref 0.2–0.7)
BUN SERPL-MCNC: 26 MG/DL (ref 8–23)
BURR CELLS: ABNORMAL
CALCIUM SERPL-MCNC: 8.9 MG/DL (ref 8.5–9.9)
CHLORIDE SERPL-SCNC: 100 MEQ/L (ref 95–107)
CO2 SERPL-SCNC: 24 MEQ/L (ref 20–31)
CREAT SERPL-MCNC: 1.44 MG/DL (ref 0.5–0.9)
EOSINOPHIL # BLD: 0 K/UL (ref 0–0.7)
EOSINOPHIL NFR BLD: 0.5 %
ERYTHROCYTE [DISTWIDTH] IN BLOOD BY AUTOMATED COUNT: 17.5 % (ref 11.5–14.5)
GLOBULIN SER CALC-MCNC: 2.9 G/DL (ref 2.3–3.5)
GLUCOSE SERPL-MCNC: 108 MG/DL (ref 70–99)
HCT VFR BLD AUTO: 32.6 % (ref 37–47)
HGB BLD-MCNC: 9.2 G/DL (ref 12–16)
HYPOCHROMIA BLD QL SMEAR: ABNORMAL
INR PPP: 1.4
LYMPHOCYTES # BLD: 0.8 K/UL (ref 1–4.8)
LYMPHOCYTES NFR BLD: 12.4 %
MCH RBC QN AUTO: 26.7 PG (ref 27–31.3)
MCHC RBC AUTO-ENTMCNC: 28.2 % (ref 33–37)
MCV RBC AUTO: 94.5 FL (ref 79.4–94.8)
MONOCYTES # BLD: 0.4 K/UL (ref 0.2–0.8)
MONOCYTES NFR BLD: 5.9 %
NEUTROPHILS # BLD: 5 K/UL (ref 1.4–6.5)
NEUTS SEG NFR BLD: 80.1 %
PLATELET # BLD AUTO: 351 K/UL (ref 130–400)
PLATELET BLD QL SMEAR: ADEQUATE
POIKILOCYTOSIS BLD QL SMEAR: ABNORMAL
POTASSIUM SERPL-SCNC: 5.2 MEQ/L (ref 3.4–4.9)
PROT SERPL-MCNC: 5.7 G/DL (ref 6.3–8)
PROTHROMBIN TIME: 16.9 SEC (ref 12.3–14.9)
RBC # BLD AUTO: 3.45 M/UL (ref 4.2–5.4)
SLIDE REVIEW: ABNORMAL
SODIUM SERPL-SCNC: 133 MEQ/L (ref 135–144)
WBC # BLD AUTO: 6.3 K/UL (ref 4.8–10.8)

## 2024-02-16 PROCEDURE — 85610 PROTHROMBIN TIME: CPT

## 2024-02-16 PROCEDURE — 99284 EMERGENCY DEPT VISIT MOD MDM: CPT

## 2024-02-16 PROCEDURE — 85025 COMPLETE CBC W/AUTO DIFF WBC: CPT

## 2024-02-16 PROCEDURE — 80053 COMPREHEN METABOLIC PANEL: CPT

## 2024-02-16 PROCEDURE — 36415 COLL VENOUS BLD VENIPUNCTURE: CPT

## 2024-02-16 PROCEDURE — 74150 CT ABDOMEN W/O CONTRAST: CPT

## 2024-02-16 ASSESSMENT — PAIN - FUNCTIONAL ASSESSMENT
PAIN_FUNCTIONAL_ASSESSMENT: NONE - DENIES PAIN

## 2024-02-16 NOTE — ED PROVIDER NOTES
[facility] ED  EMERGENCY DEPARTMENT ENCOUNTER      Pt Name: Surjit Patton  MRN: [id number]  Birthdate 1945  Date of evaluation: 2/16/2024  Provider: Anel Vuong DO    CHIEF COMPLAINT       Chief Complaint   Patient presents with    Blood in Nephrostomy tube Rsided     Pt has bilateral nephrostomy tubes, the right one has been having bloody urine         HISTORY OF PRESENT ILLNESS   (Location/Symptom, Timing/Onset, Context/Setting, Quality, Duration, Modifying Factors, Severity)  Note limiting factors.   Surjit Patton is a 78 y.o. female who presents to the emergency department .  Patient came to the ER because there was some blood in her nephrostomy drainage and there was some concern that maybe one of the tubes had gotten a pulled out.  Her tubes were placed at [facility] but the ambulance would not take her to the appropriate facility.    HPI    Nursing Notes were reviewed.    REVIEW OF SYSTEMS    (2-9 systems for level 4, 10 or more for level 5)     Review of Systems   Constitutional:  Negative for activity change, appetite change, fatigue and fever.   HENT:  Negative for congestion and sore throat.    Eyes:  Negative for pain and visual disturbance.   Respiratory:  Negative for chest tightness and shortness of breath.    Cardiovascular:  Negative for chest pain.   Gastrointestinal:  Negative for abdominal pain, nausea and vomiting.   Endocrine: Negative for polydipsia.   Genitourinary:  Negative for flank pain and urgency.   Musculoskeletal:  Negative for gait problem and neck stiffness.   Skin:  Negative for rash.   Neurological:  Negative for weakness, light-headedness and headaches.   Psychiatric/Behavioral:  Negative for confusion and sleep disturbance.        Except as noted above the remainder of the review of systems was reviewed and negative.       PAST MEDICAL HISTORY     Past Medical History:   Diagnosis Date    Atrial fibrillation (HCC)     Chronic kidney disease     COPD (chronic  Conjunctivae normal.      Pupils: Pupils are equal, round, and reactive to light.   Neck:      Thyroid: No thyromegaly.      Vascular: No JVD.      Trachea: No tracheal deviation.   Cardiovascular:      Rate and Rhythm: Normal rate.      Heart sounds: Normal heart sounds. No murmur heard.  Pulmonary:      Effort: Pulmonary effort is normal. No respiratory distress.      Breath sounds: Normal breath sounds. No wheezing.   Abdominal:      General: Bowel sounds are normal.      Palpations: Abdomen is soft.      Tenderness: There is no abdominal tenderness. There is no guarding.      Comments: Bilateral nephrostomy tubes.  Both are draining blood-tinged yellow fluid   Musculoskeletal:         General: Normal range of motion.      Cervical back: Normal range of motion and neck supple.      Right lower leg: No edema.      Left lower leg: No edema.   Skin:     General: Skin is warm and dry.      Findings: No rash.   Neurological:      Mental Status: She is alert and oriented to person, place, and time.      Cranial Nerves: No cranial nerve deficit.   Psychiatric:         Behavior: Behavior normal.         DIAGNOSTIC RESULTS     EKG: All EKG's are interpreted by the Emergency Department Physician who either signs or Co-signs this chart in the absence of a cardiologist.        RADIOLOGY:   Non-plain film images such as CT, Ultrasound and MRI are read by the radiologist. Plain radiographic images are visualized and preliminarily interpreted by the emergency physician with the below findings:        Interpretation per the Radiologist below, if available at the time of this note:    CT KIDNEY WO CONTRAST   Final Result   1. Left-sided nephrostomy catheter and internal external nephrostomy catheter   appear in good position. No evidence of hydronephrosis or nephrolithiasis.   2. Question inflammation surrounds the 2nd portion of the duodenum. Correlate   for duodenitis.   3. 5.6 cm suspected pancreatic pseudocyst.

## 2024-02-17 NOTE — DISCHARGE INSTR - COC
Continuity of Care Form    Patient Name: Surjit Patton   :  1945  MRN:  [id number]    Admit date:  2024  Discharge date:  ***    Code Status Order: Prior   Advance Directives:     Admitting Physician:  No admitting provider for patient encounter.  PCP: Jorge Diop, CORNELIUS - CNP    Discharging Nurse: ***  Discharging Hospital Unit/Room#: 15/15  Discharging Unit Phone Number: ***    Emergency Contact:   Extended Emergency Contact Information  Primary Emergency Contact: El Barrera  Home Phone: [phone]  Relation: Other  Secondary Emergency Contact: Kristin Nelson  Home Phone: [phone]  Relation: Neighbor    Past Surgical History:  No past surgical history on file.    Immunization History:   Immunization History   Administered Date(s) Administered    COVID-19, MODERNA BLUE border, Primary or Immunocompromised, (age 12y+), IM, 100 mcg/0.5mL 2021, 2021    COVID-19, PFIZER PURPLE top, DILUTE for use, (age 12 y+), 30mcg/0.3mL 10/11/2021       Active Problems:  Patient Active Problem List   Diagnosis Code    MIRIAM (acute kidney injury) ([facility]) N17.9    Closed fracture of bone of right foot S92.901A    A-fib ([facility]) I48.91    Supratherapeutic INR R79.1    Hydronephrosis N13.30       Isolation/Infection:   Isolation            No Isolation          Patient Infection Status       Infection Onset Added Last Indicated Last Indicated By Review Planned Expiration Resolved Resolved By    ESBL (Extended Spectrum Beta Lactamase) 24 Culture, Urine                Nurse Assessment:  Last Vital Signs: BP (!) 146/53   Pulse 76   Temp 98.2 °F (36.8 °C) (Oral)   Resp 18   Ht 1.524 m (5')   Wt 73.5 kg (162 lb)   SpO2 97%   BMI 31.64 kg/m²     Last documented pain score (0-10 scale):    Last Weight:   Wt Readings from Last 1 Encounters:   24 73.5 kg (162 lb)     Mental Status:  {IP PT MENTAL STATUS:}    IV Access:  {[facility] [place]ESS:[id number]}    Nursing

## 2024-02-17 NOTE — ED NOTES
Leila LUNDBERG from [facility] given report on patient returning to SNF. Still awaiting transportation.

## 2024-05-30 PROBLEM — Z51.5 HOSPICE CARE: Status: ACTIVE | Noted: 2024-05-30

## 2024-07-08 ENCOUNTER — APPOINTMENT (OUTPATIENT)
Dept: CT IMAGING | Age: 79
End: 2024-07-08
Payer: MEDICARE

## 2024-07-08 ENCOUNTER — APPOINTMENT (OUTPATIENT)
Dept: GENERAL RADIOLOGY | Age: 79
End: 2024-07-08
Payer: MEDICARE

## 2024-07-08 ENCOUNTER — HOSPITAL ENCOUNTER (EMERGENCY)
Age: 79
Discharge: HOME | End: 2024-07-08
Payer: MEDICARE

## 2024-07-08 VITALS
TEMPERATURE: 97.9 F | HEART RATE: 82 BPM | BODY MASS INDEX: 31.64 KG/M2 | WEIGHT: 162 LBS | DIASTOLIC BLOOD PRESSURE: 64 MMHG | OXYGEN SATURATION: 100 % | RESPIRATION RATE: 16 BRPM | SYSTOLIC BLOOD PRESSURE: 130 MMHG

## 2024-07-08 DIAGNOSIS — M25.571 ACUTE BILATERAL ANKLE PAIN: ICD-10-CM
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
## 2024-07-08 DIAGNOSIS — N18.9 ACUTE RENAL FAILURE SUPERIMPOSED ON CHRONIC KIDNEY DISEASE, UNSPECIFIED ACUTE RENAL FAILURE TYPE, UNSPECIFIED CKD STAGE (HCC): ICD-10-CM

## 2024-07-08 DIAGNOSIS — N17.9 ACUTE RENAL FAILURE SUPERIMPOSED ON CHRONIC KIDNEY DISEASE, UNSPECIFIED ACUTE RENAL FAILURE TYPE, UNSPECIFIED CKD STAGE (HCC): ICD-10-CM

## 2024-07-08 DIAGNOSIS — M25.551 RIGHT HIP PAIN: ICD-10-CM

## 2024-07-08 DIAGNOSIS — M25.572 ACUTE BILATERAL ANKLE PAIN: ICD-10-CM

## 2024-07-08 DIAGNOSIS — S81.811A SKIN TEAR OF RIGHT LOWER LEG WITHOUT COMPLICATION, INITIAL ENCOUNTER: ICD-10-CM

## 2024-07-08 DIAGNOSIS — S52.502A CLOSED FRACTURE OF DISTAL END OF LEFT RADIUS, UNSPECIFIED FRACTURE MORPHOLOGY, INITIAL ENCOUNTER: ICD-10-CM

## 2024-07-08 LAB
ABO + RH BLD: NORMAL
ABO + RH BLD: NORMAL
ALBUMIN SERPL-MCNC: 2.8 G/DL (ref 3.5–4.6)
ALP SERPL-CCNC: 93 U/L (ref 40–130)
ALT SERPL-CCNC: 7 U/L (ref 0–33)
ANION GAP SERPL CALCULATED.3IONS-SCNC: 16 MEQ/L (ref 9–15)
ANISOCYTOSIS BLD QL SMEAR: ABNORMAL
AST SERPL-CCNC: 11 U/L (ref 0–35)
BASOPHILS # BLD: 0 K/UL (ref 0–0.2)
BASOPHILS NFR BLD: 0.1 %
BILIRUB SERPL-MCNC: 0.5 MG/DL (ref 0.2–0.7)
BLD GP AB SCN SERPL QL: NORMAL
BUN SERPL-MCNC: 88 MG/DL (ref 8–23)
BURR CELLS: ABNORMAL
CALCIUM SERPL-MCNC: 9.6 MG/DL (ref 8.5–9.9)
CHLORIDE SERPL-SCNC: 97 MEQ/L (ref 95–107)
CO2 SERPL-SCNC: 11 MEQ/L (ref 20–31)
CREAT SERPL-MCNC: 4.16 MG/DL (ref 0.5–0.9)
EOSINOPHIL # BLD: 0 K/UL (ref 0–0.7)
EOSINOPHIL NFR BLD: 0.1 %
ERYTHROCYTE [DISTWIDTH] IN BLOOD BY AUTOMATED COUNT: 21 % (ref 11.5–14.5)
GLOBULIN SER CALC-MCNC: 4.1 G/DL (ref 2.3–3.5)
GLUCOSE SERPL-MCNC: 130 MG/DL (ref 70–99)
HCT VFR BLD AUTO: 32.1 % (ref 37–47)
HGB BLD-MCNC: 9.6 G/DL (ref 12–16)
LYMPHOCYTES # BLD: 0.6 K/UL (ref 1–4.8)
LYMPHOCYTES NFR BLD: 6 %
MACROCYTES BLD QL SMEAR: ABNORMAL
MCH RBC QN AUTO: 25.9 PG (ref 27–31.3)
MCHC RBC AUTO-ENTMCNC: 29.9 % (ref 33–37)
MCV RBC AUTO: 86.5 FL (ref 79.4–94.8)
MONOCYTES # BLD: 0.5 K/UL (ref 0.2–0.8)
MONOCYTES NFR BLD: 4.8 %
NEUTROPHILS # BLD: 8.6 K/UL (ref 1.4–6.5)
NEUTS SEG NFR BLD: 89 %
OVALOCYTES BLD QL SMEAR: ABNORMAL
PLATELET # BLD AUTO: 284 K/UL (ref 130–400)
PLATELET BLD QL SMEAR: ADEQUATE
POIKILOCYTOSIS BLD QL SMEAR: ABNORMAL
POTASSIUM SERPL-SCNC: 6.2 MEQ/L (ref 3.4–4.9)
PROMYELOCYTES NFR BLD MANUAL: 1 %
PROT SERPL-MCNC: 6.9 G/DL (ref 6.3–8)
RBC # BLD AUTO: 3.71 M/UL (ref 4.2–5.4)
SLIDE REVIEW: ABNORMAL
SMUDGE CELLS BLD QL SMEAR: 8.6
SODIUM SERPL-SCNC: 124 MEQ/L (ref 135–144)
WBC # BLD AUTO: 9.6 K/UL (ref 4.8–10.8)

## 2024-07-08 PROCEDURE — 73110 X-RAY EXAM OF WRIST: CPT

## 2024-07-08 PROCEDURE — 72125 CT NECK SPINE W/O DYE: CPT

## 2024-07-08 PROCEDURE — 85025 COMPLETE CBC W/AUTO DIFF WBC: CPT

## 2024-07-08 PROCEDURE — 6370000000 HC RX 637 (ALT 250 FOR IP): Performed by: PHYSICIAN ASSISTANT

## 2024-07-08 PROCEDURE — 86850 RBC ANTIBODY SCREEN: CPT

## 2024-07-08 PROCEDURE — 2580000003 HC RX 258: Performed by: PHYSICIAN ASSISTANT

## 2024-07-08 PROCEDURE — 73610 X-RAY EXAM OF ANKLE: CPT

## 2024-07-08 PROCEDURE — 29125 APPL SHORT ARM SPLINT STATIC: CPT

## 2024-07-08 PROCEDURE — 99284 EMERGENCY DEPT VISIT MOD MDM: CPT

## 2024-07-08 PROCEDURE — 86900 BLOOD TYPING SEROLOGIC ABO: CPT

## 2024-07-08 PROCEDURE — 70450 CT HEAD/BRAIN W/O DYE: CPT

## 2024-07-08 PROCEDURE — 73030 X-RAY EXAM OF SHOULDER: CPT

## 2024-07-08 PROCEDURE — 36415 COLL VENOUS BLD VENIPUNCTURE: CPT

## 2024-07-08 PROCEDURE — 86901 BLOOD TYPING SEROLOGIC RH(D): CPT

## 2024-07-08 PROCEDURE — 80053 COMPREHEN METABOLIC PANEL: CPT

## 2024-07-08 PROCEDURE — 73502 X-RAY EXAM HIP UNI 2-3 VIEWS: CPT

## 2024-07-08 RX ORDER — 0.9 % SODIUM CHLORIDE 0.9 %
500 INTRAVENOUS SOLUTION INTRAVENOUS ONCE
Status: COMPLETED | OUTPATIENT
Start: 2024-07-08 | End: 2024-07-08

## 2024-07-08 RX ORDER — BACITRACIN ZINC 500 [USP'U]/G
OINTMENT TOPICAL ONCE
Status: COMPLETED | OUTPATIENT
Start: 2024-07-08 | End: 2024-07-08

## 2024-07-08 RX ADMIN — BACITRACIN ZINC: 500 OINTMENT TOPICAL at 09:19

## 2024-07-08 RX ADMIN — SODIUM ZIRCONIUM CYCLOSILICATE 10 G: 5 POWDER, FOR SUSPENSION ORAL at 09:19

## 2024-07-08 RX ADMIN — SODIUM CHLORIDE 500 ML: 9 INJECTION, SOLUTION INTRAVENOUS at 06:43

## 2024-07-08 RX ADMIN — SODIUM CHLORIDE 500 ML: 9 INJECTION, SOLUTION INTRAVENOUS at 09:16

## 2024-07-08 ASSESSMENT — ENCOUNTER SYMPTOMS
DIARRHEA: 0
COUGH: 0
SHORTNESS OF BREATH: 0
SORE THROAT: 0
NAUSEA: 0
PHOTOPHOBIA: 0
VOMITING: 0
EYE PAIN: 0
ABDOMINAL PAIN: 0
BACK PAIN: 0
RHINORRHEA: 0

## 2024-07-08 NOTE — CARE COORDINATION
This nurse called [facility] hospice at [facility]'s request. The patient's  is Mary and the nurse at [facility] will have her call [place]. Nursing secretary Celena was notified.

## 2024-07-08 NOTE — DISCHARGE INSTRUCTIONS
X-ray of right hip shows no acute fracture if pain persists could be subtle fracture missed due to osteopenia and degenerative changes on imaging.  X-ray of left wrist shows a subtle cortical lucency on the lateral view of the left wrist questionable nondisplaced fracture through the cortex of the distal radius she does have some point tenderness so that is why we applied a wrist splint.  Can follow-up with Ortho but low likelihood this would need any surgical or further intervention can discuss further with your hospice and nursing home team.  Your kidney function was significantly worse today, you elected for no further intervention of this and having the nursing home monitor it.

## 2024-07-08 NOTE — ED PROVIDER NOTES
(*)     CO2 11 (*)     Anion Gap 16 (*)     Glucose 130 (*)     BUN 88 (*)     Creatinine 4.16 (*)     Est, Glom Filt Rate 10.2 (*)     Albumin 2.8 (*)     Globulin 4.1 (*)     All other components within normal limits    Narrative:     CALL  [facility] tel. [phone],  BUN K  results called to and read back by April Amos, 07/08/2024 07:39, by  MALLI   TYPE AND SCREEN   ABO/RH       All other labs were within normal range or not returned as of this dictation.    EMERGENCY DEPARTMENT COURSE and DIFFERENTIAL DIAGNOSIS/MDM:   Vitals:    Vitals:    07/08/24 0602 07/08/24 0630 07/08/24 0800   BP: 105/63 121/62 130/64   Pulse: 68  82   Resp: 16     Temp: 97.9 °F (36.6 °C)     SpO2: 97% 100% 100%   Weight: 73.5 kg (162 lb)             MDM    Patient presents to the emergency department after fall out of bed.  She is on hospice but family wanted her sent to the emergency department for evaluation.  On arrival she is alert and oriented x 4 denies any headache.  She complains of right hip pain right shoulder pain left wrist pain and then on exam she did have some ankle tenderness.  All of these imaging studies were ordered.  She is neurovascularly intact.  She is bedbound/wheelchair-bound does not ambulate.  She was medicated with fentanyl prior to arrival that has been controlling her pain.  She does have a skin tear to right lateral knee wound was cleaned bacitracin applied and dressing.  Patient has significant worsening renal function I did discuss this with the patient as well as medical POA who is at bedside, she was given 1 L of IV fluids and Lokelma for her hyperkalemia.  Due to being on hospice patient and POA do not want her to be admitted at this point and feel comfortable following this up with the nursing home when she does know her kidney function is worsening.  I will speak to hospice and update them regarding this.  Patient has mild tenderness to palpation of right lateral hip there is no obvious fracture on

## 2024-07-08 NOTE — ED NOTES
Pt arrived to ED via EMS with c/o of fall. Pt had unwitnessed fall. Pt was found on ground by bed. Pt does not remember the fall. Pt c/o of right hip pain. Pt is currently on hospice. Pt is a&ox4